# Patient Record
Sex: FEMALE | URBAN - METROPOLITAN AREA
[De-identification: names, ages, dates, MRNs, and addresses within clinical notes are randomized per-mention and may not be internally consistent; named-entity substitution may affect disease eponyms.]

---

## 2018-07-26 ENCOUNTER — OFFICE VISIT (OUTPATIENT)
Dept: URBAN - METROPOLITAN AREA CLINIC 76 | Facility: CLINIC | Age: 73
End: 2018-07-26
Payer: MEDICARE

## 2018-07-26 PROCEDURE — 92004 COMPRE OPH EXAM NEW PT 1/>: CPT | Performed by: OPTOMETRIST

## 2018-07-26 ASSESSMENT — KERATOMETRY
OD: 42.50
OS: 44.00

## 2018-07-26 ASSESSMENT — INTRAOCULAR PRESSURE
OD: 23
OS: 21

## 2018-07-26 ASSESSMENT — VISUAL ACUITY
OS: 20/50
OD: 20/40

## 2018-08-01 ENCOUNTER — OFFICE VISIT (OUTPATIENT)
Dept: URBAN - METROPOLITAN AREA CLINIC 76 | Facility: CLINIC | Age: 73
End: 2018-08-01
Payer: MEDICARE

## 2018-08-01 DIAGNOSIS — H04.123 DRY EYE SYNDROME OF BILATERAL LACRIMAL GLANDS: Primary | ICD-10-CM

## 2018-08-01 DIAGNOSIS — H52.4 PRESBYOPIA: ICD-10-CM

## 2018-08-01 PROCEDURE — 92004 COMPRE OPH EXAM NEW PT 1/>: CPT | Performed by: OPTOMETRIST

## 2018-08-01 ASSESSMENT — INTRAOCULAR PRESSURE
OS: 20
OD: 22

## 2018-08-01 ASSESSMENT — VISUAL ACUITY
OD: 20/40
OS: 20/30

## 2018-08-01 NOTE — IMPRESSION/PLAN
Impression: Bilateral cataracta brunescens: H25.13. Plan: Cataracts account for the patient's complaints, however, pt defers cat eval at this time.

## 2018-08-01 NOTE — IMPRESSION/PLAN
Impression: Presbyopia: H52.4. Plan: New gls rx dispensed today. Pt aware of limits of new gls rx OD > OS.

## 2018-08-22 ENCOUNTER — OFFICE VISIT (OUTPATIENT)
Dept: URBAN - METROPOLITAN AREA CLINIC 76 | Facility: CLINIC | Age: 73
End: 2018-08-22
Payer: MEDICARE

## 2018-08-22 PROCEDURE — 92014 COMPRE OPH EXAM EST PT 1/>: CPT | Performed by: OPHTHALMOLOGY

## 2018-08-22 PROCEDURE — 92004 COMPRE OPH EXAM NEW PT 1/>: CPT | Performed by: OPHTHALMOLOGY

## 2018-08-22 ASSESSMENT — VISUAL ACUITY
OD: 20/50
OS: 20/30

## 2018-08-22 ASSESSMENT — KERATOMETRY
OD: 42.75
OS: 43.25

## 2018-08-22 ASSESSMENT — INTRAOCULAR PRESSURE
OD: 25
OS: 21

## 2018-08-22 NOTE — IMPRESSION/PLAN
Impression: Ocular hypertension, bilateral: H40.053. strong family history. Plan: Discussed condition. Needs baseline tests prior to cat sx.

## 2018-08-22 NOTE — IMPRESSION/PLAN
Impression: Age-related nuclear cataract, bilateral: H25.13. OU. Visually significant. Plan: Cataracts account for the patient's complaints. Discussed all risks, benefits, procedures and recovery. Patient understands changing glasses will not improve vision. Discussed added risk due to [facility] and pterygium. Patient desires to have surgery, recommend CE IOL OU, OD first.  Discussed iol options, recommend STANDARD IOL. TARGET: DISTANCE. RL2. Discussed astigmatism diagnosis with Patient. Patient understands that Standard lens does not correct for Astigmatism and patient will need gls distance and near after Cataract Surgery. Patient can consider toric iol advised additional testing will need to be done for final iol recommendations. Patient understands.

## 2018-09-04 ENCOUNTER — OFFICE VISIT (OUTPATIENT)
Dept: URBAN - METROPOLITAN AREA CLINIC 76 | Facility: CLINIC | Age: 73
End: 2018-09-04
Payer: MEDICARE

## 2018-09-04 DIAGNOSIS — H40.053 OCULAR HYPERTENSION, BILATERAL: Primary | ICD-10-CM

## 2018-09-04 DIAGNOSIS — H25.13 AGE-RELATED NUCLEAR CATARACT, BILATERAL: ICD-10-CM

## 2018-09-04 DIAGNOSIS — H11.012 AMYLOID PTERYGIUM OF LEFT EYE: ICD-10-CM

## 2018-09-04 PROCEDURE — 76514 ECHO EXAM OF EYE THICKNESS: CPT | Performed by: OPHTHALMOLOGY

## 2018-09-04 PROCEDURE — 92133 CPTRZD OPH DX IMG PST SGM ON: CPT | Performed by: OPHTHALMOLOGY

## 2018-09-04 PROCEDURE — 99213 OFFICE O/P EST LOW 20 MIN: CPT | Performed by: OPHTHALMOLOGY

## 2018-09-04 PROCEDURE — 92083 EXTENDED VISUAL FIELD XM: CPT | Performed by: OPHTHALMOLOGY

## 2018-09-04 ASSESSMENT — INTRAOCULAR PRESSURE
OD: 18
OS: 18

## 2018-09-04 NOTE — IMPRESSION/PLAN
Impression: Age-related nuclear cataract, bilateral: H25.13. OU. Visually significant. Plan: Cataracts account for the patient's complaints. Discussed all risks, benefits, procedures and recovery. Patient understands changing glasses will not improve vision. Discussed added risk due to [facility] and pterygium. Patient desires to have surgery, recommend CE IOL OU, OD first.  Discussed iol options, recommend STANDARD IOL. TARGET: DISTANCE. RL2. Discussed astigmatism diagnosis with Patient. Patient understands that Standard lens does not correct for Astigmatism and patient will need gls distance and near after Cataract Surgery. Patient can consider toric iol advised additional testing will need to be done for final iol recommendations. Patient understands.

## 2018-09-04 NOTE — IMPRESSION/PLAN
Impression: Ocular hypertension, bilateral: H40.053.  strong family history. IOP OU good today. Baseline VF and OCT reviewed today. ? early defects vs cataracts. first time test taker Plan: Continue to monitor. No treatment recommended at this time. Will reasses after cataract sx.

## 2018-09-06 ENCOUNTER — PRE-OPERATIVE VISIT (OUTPATIENT)
Dept: URBAN - METROPOLITAN AREA CLINIC 76 | Facility: CLINIC | Age: 73
End: 2018-09-06
Payer: MEDICARE

## 2018-09-06 PROCEDURE — 92136 OPHTHALMIC BIOMETRY: CPT | Performed by: OPHTHALMOLOGY

## 2018-09-06 RX ORDER — OFLOXACIN 3 MG/ML
0.3 % SOLUTION/ DROPS OPHTHALMIC
Qty: 1 | Refills: 1 | Status: INACTIVE
Start: 2018-09-06 | End: 2018-10-17

## 2018-09-06 RX ORDER — DUREZOL 0.5 MG/ML
0.05 % EMULSION OPHTHALMIC
Qty: 1 | Refills: 1 | Status: INACTIVE
Start: 2018-09-06 | End: 2018-10-22

## 2018-09-06 ASSESSMENT — PACHYMETRY
OS: 3.24
OS: 24.01
OD: 23.92
OD: 3.25

## 2018-09-17 ENCOUNTER — SURGERY (OUTPATIENT)
Dept: URBAN - METROPOLITAN AREA SURGERY 47 | Facility: SURGERY | Age: 73
End: 2018-09-17
Payer: MEDICARE

## 2018-09-17 PROCEDURE — 66984 XCAPSL CTRC RMVL W/O ECP: CPT | Performed by: OPHTHALMOLOGY

## 2018-09-18 ENCOUNTER — POST-OPERATIVE VISIT (OUTPATIENT)
Dept: URBAN - METROPOLITAN AREA CLINIC 76 | Facility: CLINIC | Age: 73
End: 2018-09-18
Payer: MEDICARE

## 2018-09-18 DIAGNOSIS — Z09 ENCNTR FOR F/U EXAM AFT TRTMT FOR COND OTH THAN MALIG NEOPLM: Primary | ICD-10-CM

## 2018-09-18 PROCEDURE — 99024 POSTOP FOLLOW-UP VISIT: CPT | Performed by: OPTOMETRIST

## 2018-09-18 ASSESSMENT — INTRAOCULAR PRESSURE
OS: 21
OD: 29

## 2018-09-25 ENCOUNTER — POST-OPERATIVE VISIT (OUTPATIENT)
Dept: URBAN - METROPOLITAN AREA CLINIC 76 | Facility: CLINIC | Age: 73
End: 2018-09-25
Payer: MEDICARE

## 2018-09-25 PROCEDURE — 99024 POSTOP FOLLOW-UP VISIT: CPT | Performed by: OPHTHALMOLOGY

## 2018-09-25 ASSESSMENT — VISUAL ACUITY: OD: 20/30

## 2018-09-25 ASSESSMENT — INTRAOCULAR PRESSURE
OS: 21
OD: 18

## 2018-10-17 ENCOUNTER — POST-OPERATIVE VISIT (OUTPATIENT)
Dept: URBAN - METROPOLITAN AREA CLINIC 76 | Facility: CLINIC | Age: 73
End: 2018-10-17
Payer: MEDICARE

## 2018-10-17 PROCEDURE — 99024 POSTOP FOLLOW-UP VISIT: CPT | Performed by: OPHTHALMOLOGY

## 2018-10-17 ASSESSMENT — INTRAOCULAR PRESSURE
OS: 18
OD: 18

## 2018-10-17 ASSESSMENT — VISUAL ACUITY
OS: 20/40
OD: 20/25-